# Patient Record
Sex: FEMALE | Race: AMERICAN INDIAN OR ALASKA NATIVE
[De-identification: names, ages, dates, MRNs, and addresses within clinical notes are randomized per-mention and may not be internally consistent; named-entity substitution may affect disease eponyms.]

---

## 2021-01-05 ENCOUNTER — HOSPITAL ENCOUNTER (EMERGENCY)
Dept: HOSPITAL 5 - ED | Age: 21
Discharge: HOME | End: 2021-01-05
Payer: SELF-PAY

## 2021-01-05 VITALS — SYSTOLIC BLOOD PRESSURE: 145 MMHG | DIASTOLIC BLOOD PRESSURE: 69 MMHG

## 2021-01-05 DIAGNOSIS — N94.6: Primary | ICD-10-CM

## 2021-01-05 DIAGNOSIS — Z79.899: ICD-10-CM

## 2021-01-05 PROCEDURE — 99282 EMERGENCY DEPT VISIT SF MDM: CPT

## 2021-01-05 NOTE — EMERGENCY DEPARTMENT REPORT
ED General Adult HPI





- General


Chief complaint: Abdominal Pain


Stated complaint: SEVERE MENSTRUAL PAIN


Time Seen by Provider: 01/05/21 01:05


Source: patient


Mode of arrival: Ambulatory


Limitations: No Limitations





- History of Present Illness


Initial comments: 





Patient is a 20-year-old female presents emergency room with suprapubic 

abdominal cramping that began 2 days ago.  She states that her normal menstrual 

cycle just began 3 days ago.  She states that she has been taking Tylenol 

without much relief.  She has not tried any other medications.  She states that 

she has associated nausea and has had one episode of vomiting over the last 3 

days.  She is tolerating p.o. intake without difficulty.  She states that she is

changing her pad approximately every 3 hours which she reports is normal.  

Patient states that she was just started on birth control last month.  She 

denies any heavy bleeding, passing clots, fever, chills, diarrhea, dysuria, abno

rmal vaginal discharge.  No past medical history.  No allergies to medications.





- Related Data


                                  Previous Rx's











 Medication  Instructions  Recorded  Last Taken  Type


 


Butalb/Acetamin/Caff -40 1 - 2 tab PO Q6HR PRN #15 tab 06/12/20 Unknown Rx





[Fioricet -40]    


 


Ketorolac [Toradol] 10 mg PO Q8H PRN #20 tablet 06/12/20 Unknown Rx


 


Ondansetron [Zofran Odt] 4 mg PO Q6HR PRN #15 tab.rapdis 06/12/20 Unknown Rx


 


Ketorolac [Toradol] 10 mg PO Q8HR PRN #10 tablet 01/05/21 Unknown Rx


 


Ondansetron [Zofran Odt] 4 mg PO Q8HR PRN #10 tab.rapdis 01/05/21 Unknown Rx











                                    Allergies











Allergy/AdvReac Type Severity Reaction Status Date / Time


 


No Known Allergies Allergy   Unverified 06/11/20 23:45














ED Review of Systems


ROS: 


Stated complaint: SEVERE MENSTRUAL PAIN


Other details as noted in HPI





Comment: All other systems reviewed and negative





ED Past Medical Hx





- Past Medical History


Previous Medical History?: No





- Surgical History


Past Surgical History?: No





- Social History


Smoking Status: Never Smoker


Substance Use Type: Alcohol





- Medications


Home Medications: 


                                Home Medications











 Medication  Instructions  Recorded  Confirmed  Last Taken  Type


 


Butalb/Acetamin/Caff -40 1 - 2 tab PO Q6HR PRN #15 tab 06/12/20  Unknown 

Rx





[Fioricet -40]     


 


Ketorolac [Toradol] 10 mg PO Q8H PRN #20 tablet 06/12/20  Unknown Rx


 


Ondansetron [Zofran Odt] 4 mg PO Q6HR PRN #15 tab.rapdis 06/12/20  Unknown Rx


 


Ketorolac [Toradol] 10 mg PO Q8HR PRN #10 tablet 01/05/21  Unknown Rx


 


Ondansetron [Zofran Odt] 4 mg PO Q8HR PRN #10 tab.rapdis 01/05/21  Unknown Rx














ED Physical Exam





- General


Limitations: No Limitations


General appearance: alert, in no apparent distress





- Head


Head exam: Present: atraumatic, normocephalic





- Eye


Eye exam: Present: normal appearance





- ENT


ENT exam: Present: mucous membranes moist





- Respiratory


Respiratory exam: Present: normal lung sounds bilaterally.  Absent: respiratory 

distress, wheezes, rales, rhonchi, stridor, chest wall tenderness, accessory 

muscle use, decreased breath sounds, prolonged expiratory





- Cardiovascular


Cardiovascular Exam: Present: regular rate, normal rhythm, normal heart sounds. 

Absent: systolic murmur, diastolic murmur, rubs, gallop





- GI/Abdominal


GI/Abdominal exam: Present: soft, normal bowel sounds.  Absent: distended, 

tenderness, guarding, rebound, rigid





- Neurological Exam


Neurological exam: Present: alert, oriented X3





- Psychiatric


Psychiatric exam: Present: normal affect, normal mood





- Skin


Skin exam: Present: warm, dry, intact





ED Course


                                   Vital Signs











  01/05/21





  00:45


 


Temperature 98.1 F


 


Pulse Rate 96 H


 


Respiratory 16





Rate 


 


Blood Pressure 145/69





[Right] 


 


O2 Sat by Pulse 100





Oximetry 














ED Medical Decision Making





- Medical Decision Making





Patient is a 20-year-old female presents emergency room with suprapubic 

abdominal cramping that began 2 days ago.  She states that her normal menstrual 

cycle just began 3 days ago.  She states that she has been taking Tylenol 

without much relief.  She has not tried any other medications.  She states that 

she has associated nausea and has had one episode of vomiting over the last 3 

days.  She is tolerating p.o. intake without difficulty.  She states that she is

 changing her pad approximately every 3 hours which she reports is normal.  

Patient states that she was just started on birth control last month.  She 

denies any heavy bleeding, passing clots, fever, chills, diarrhea, dysuria, 

abnormal vaginal discharge.  No past medical history.  No allergies to 

medications. VSS.  No abdominal tenderness on exam, no guarding, no rebound, no 

rigidity, normal bowel sounds.  Patient is presenting with dysmenorrhea.  She 

denies any menorrhagia.  She has no tenderness on exam.  She is not having any 

urinary symptoms.  She is tolerating p.o. intake.  Patient given prescription 

for Toradol and Zofran.  Patient be referred to OB/GYN, discussed the importance

 of follow-up with patient.  Discussed return precautions.  Advised patient 

Please take medication as prescribed as needed.  Increase your water intake.  

Follow-up with OB/GYN.  Return to emergency room for any new or worsening 

symptoms.


Critical care attestation.: 


If time is entered above; I have spent that time in minutes in the direct care 

of this critically ill patient, excluding procedure time.








ED Disposition


Clinical Impression: 


 Dysmenorrhea





Disposition: DC-01 TO HOME OR SELFCARE


Is pt being admited?: No


Does the pt Need Aspirin: No


Condition: Stable


Instructions:  Dysmenorrhea, Abdominal Pain (ED)


Additional Instructions: 


Please take medication as prescribed as needed.  Increase your water intake.  

Follow-up with OB/GYN.  Return to emergency room for any new or worsening sympt

oms.


Prescriptions: 


Ketorolac [Toradol] 10 mg PO Q8HR PRN #10 tablet


 PRN Reason: Pain


Ondansetron [Zofran Odt] 4 mg PO Q8HR PRN #10 tab.rapdis


 PRN Reason: Nausea And Vomiting


Referrals: 


[facility] [Primary Care Provider] - 2-3 Days


[facility] [Provider Group] - 2-3 Days


[facility] [Outside] - 2-3 Days


[facility] [Provider Group] - 2-3 Days


Forms:  Work/School Release Form(ED)


Time of Disposition: 01:09


Print Language: ENGLISH